# Patient Record
Sex: MALE | Race: WHITE
[De-identification: names, ages, dates, MRNs, and addresses within clinical notes are randomized per-mention and may not be internally consistent; named-entity substitution may affect disease eponyms.]

---

## 2023-06-14 ENCOUNTER — HOSPITAL ENCOUNTER (OUTPATIENT)
Dept: HOSPITAL 75 - CARD | Age: 68
End: 2023-06-14
Attending: INTERNAL MEDICINE
Payer: MEDICARE

## 2023-06-14 DIAGNOSIS — I25.10: Primary | ICD-10-CM

## 2023-06-14 PROCEDURE — 93306 TTE W/DOPPLER COMPLETE: CPT

## 2023-07-12 ENCOUNTER — HOSPITAL ENCOUNTER (OUTPATIENT)
Dept: HOSPITAL 75 - CARD | Age: 68
End: 2023-07-12
Attending: INTERNAL MEDICINE
Payer: MEDICARE

## 2023-07-12 VITALS — DIASTOLIC BLOOD PRESSURE: 82 MMHG | SYSTOLIC BLOOD PRESSURE: 154 MMHG

## 2023-07-12 DIAGNOSIS — I25.10: Primary | ICD-10-CM

## 2023-07-12 PROCEDURE — 78452 HT MUSCLE IMAGE SPECT MULT: CPT

## 2023-07-12 PROCEDURE — 93017 CV STRESS TEST TRACING ONLY: CPT

## 2023-07-13 NOTE — STRESS TEST
DATE OF SERVICE: 07/12/2023



RESTING AND POST REGADENOSON TECHNETIUM-99M TETROFOSMIN SPECT CT IMAGING:



CLINICAL DIAGNOSIS:

Coronary artery disease.



ORDERING PHYSICIAN:

Dr. Greenberg.



Baseline images were carried out after injection of 10.63 mCi of technetium-99m 

tetrofosmin.  This was followed by 0.4 mg regadenoson and 32.8 mCi of 

technetium-99m tetrofosmin for stress imaging.  The electrocardiogram showed 

sinus rhythm at baseline.  It did not change significantly with regadenoson 

infusion.  The patient tolerated the procedure well.  Review of images at rest 

and following stress does not indicate any distinct perfusion defects consistent

with significant myocardial ischemia or infarction.  Some degree of apical 

thinning is seen both at rest and following regadenoson infusion.  Gated images 

show normal global left ventricular systolic function with normal regional wall 

motion.  Left ventricular ejection fraction is calculated to be 59%.  Left 

ventricular end-diastolic volume 88 mL, TID is absent (1.05).



CONCLUSIONS:

1.  No evidence of significant myocardial ischemia or infarction on study.

2.  Normal regional wall motion.

3.  Normal global left ventricular systolic function with a calculated ejection 

fraction of 59%.





Job ID: [id number]

DocumentID: [id number]

Dictated Date: 07/13/2023 10:06:41

Transcription Date: 07/13/2023 12:06:00

Dictated By: JAVIER GREENBEGR MD; MA; FACP; FACC;